# Patient Record
Sex: FEMALE | Race: BLACK OR AFRICAN AMERICAN | NOT HISPANIC OR LATINO | URBAN - METROPOLITAN AREA
[De-identification: names, ages, dates, MRNs, and addresses within clinical notes are randomized per-mention and may not be internally consistent; named-entity substitution may affect disease eponyms.]

---

## 2023-06-23 LAB
ALT SERPL-CCNC: 16 U/L (ref 6–29)
AST SERPL-CCNC: 19 U/L (ref 10–35)
CHOLESTEROL (EXTERNAL): 156 MG/DL
CREATININE (EXTERNAL): 0.73 MG/DL (ref 0.5–0.99)
GFR ESTIMATED (EXTERNAL): 103 ML/MIN/1.73M2
GLUCOSE (EXTERNAL): 79 MG/DL (ref 65–99)
HBA1C MFR BLD: 5.1 %
HDLC SERPL-MCNC: 49 MG/DL
HEP C HIM: NORMAL
HIV 1&2 EXT: NORMAL
NON HDL CHOLESTEROL (EXTERNAL): 107 MG/DL
POTASSIUM (EXTERNAL): 4.2 MMOL/L (ref 3.5–5.3)
TRIGLYCERIDES (EXTERNAL): 81 MG/DL

## 2023-07-12 ENCOUNTER — TRANSFERRED RECORDS (OUTPATIENT)
Dept: HEALTH INFORMATION MANAGEMENT | Facility: CLINIC | Age: 43
End: 2023-07-12

## 2023-07-18 ENCOUNTER — TRANSCRIBE ORDERS (OUTPATIENT)
Dept: OTHER | Age: 43
End: 2023-07-18

## 2023-07-18 DIAGNOSIS — T78.1XXA FOOD SENSITIVITY WITH GASTROINTESTINAL SYMPTOMS: Primary | ICD-10-CM

## 2023-12-31 NOTE — TELEPHONE ENCOUNTER
FUTURE VISIT INFORMATION      FUTURE VISIT INFORMATION:  Date: 3.13.24  Time: 11:00  Location: [facility] [place]  REFERRAL INFORMATION:  Referring provider:  Mason  Referring providers clinic:  [facility]  Reason for visit/diagnosis  Alisha Aguilera/ T78.1XXA (ICD-10-CM) - Food sensitivity with gastrointestinal symptoms/SHAKIRA RUSSELL/ No Recs      RECORDS REQUESTED FROM:       Clinic name Comments Records Status Imaging Status   [facility] Received Sent to scanning                                    Action 12.30.23 rashmi   Action Taken Faxed records request to [facility] at [phone].     Action 1.4.24 rashmi   Action Taken Records received and sent to [facility] for scanning into pts chart.

## 2024-03-06 ENCOUNTER — TELEPHONE (OUTPATIENT)
Dept: DERMATOLOGY | Facility: CLINIC | Age: 44
End: 2024-03-06
Payer: COMMERCIAL

## 2024-03-13 ENCOUNTER — PRE VISIT (OUTPATIENT)
Dept: ALLERGY | Facility: CLINIC | Age: 44
End: 2024-03-13